# Patient Record
Sex: FEMALE | Race: WHITE | NOT HISPANIC OR LATINO | Employment: FULL TIME | URBAN - METROPOLITAN AREA
[De-identification: names, ages, dates, MRNs, and addresses within clinical notes are randomized per-mention and may not be internally consistent; named-entity substitution may affect disease eponyms.]

---

## 2023-02-15 ENCOUNTER — SOCIAL WORK (OUTPATIENT)
Dept: ADMINISTRATIVE | Facility: OTHER | Age: 58
End: 2023-02-15

## 2023-02-15 NOTE — PROGRESS NOTES
SW reviewed consult for pt in need of assistance for medication assistance (Humira). Pt is uninsured at this time. Pt presented to  clinic and met with  Nicol Owens for further assistance with getting established with PAP program. SW will continue to assist as needed.     ORESTES Gomez    [phone] (phone)  [phone] (fax)

## 2023-02-21 ENCOUNTER — SOCIAL WORK (OUTPATIENT)
Dept: ADMINISTRATIVE | Facility: OTHER | Age: 58
End: 2023-02-21

## 2023-02-21 NOTE — PROGRESS NOTES
Pt has establish a new enrollment for [facility] through PAP. A doctor signature is needed on the negin. Once received, pap negin will be submitted. will keep pt posted on enrollment status.    Nicol Owens    [phone]

## 2023-02-23 ENCOUNTER — SOCIAL WORK (OUTPATIENT)
Dept: ADMINISTRATIVE | Facility: OTHER | Age: 58
End: 2023-02-23

## 2023-02-23 NOTE — PROGRESS NOTES
A doctor signature has been received, pap negin has been submitted. A decision will be made in 7 to 10 days.    Nicol Owens    [phone]

## 2023-03-08 ENCOUNTER — SOCIAL WORK (OUTPATIENT)
Dept: ADMINISTRATIVE | Facility: OTHER | Age: 58
End: 2023-03-08

## 2023-06-22 ENCOUNTER — TELEPHONE (OUTPATIENT)
Dept: PHARMACY | Facility: CLINIC | Age: 58
End: 2023-06-22

## 2023-06-22 ENCOUNTER — SPECIALTY PHARMACY (OUTPATIENT)
Dept: PHARMACY | Facility: CLINIC | Age: 58
End: 2023-06-22

## 2023-06-22 DIAGNOSIS — M06.9 RHEUMATOID ARTHRITIS FLARE: Primary | ICD-10-CM

## 2023-06-22 NOTE — TELEPHONE ENCOUNTER
Hemalatha, this is Porsha Gray, clinical pharmacist with [facility] that is part of your care team.  We have begun working on your prescription that your doctor has sent us. Our next steps include:     Working with your insurance company to obtain approval for your medication  Working with you to ensure your medication is affordable     We will be calling you along the way with updates on your medication but if you have any concerns or receive information that you would like to discuss please reach us at [phone].    Welcome call outcome: Patient/caregiver reached.

## 2023-06-22 NOTE — TELEPHONE ENCOUNTER
Specialty Pharmacy - Initial Clinical Assessment    Specialty Medication Orders Linked to Encounter      Flowsheet Row Most Recent Value   Medication #1 methotrexate 2.5 MG Tab (Order#[id number], Rx#[id number])          Patient Diagnosis   Rheumatoid arthritis M60.9     Humera Briones is a 57 y.o. female, who is followed by the specialty pharmacy service for management and education.    Recent Encounters       Date Type Provider Description    06/22/2023 Specialty Pharmacy Porsha Gray PharmD Initial Clinical Assessment    06/22/2023 Specialty Pharmacy Porsha Gray PharmD             Current Outpatient Medications   Medication Sig    adalimumab 40 mg/0.4 mL PnKt Inject 40 mg into the skin every 14 (fourteen) days.    folic acid (FOLVITE) 1 MG tablet Take 2 tablets (2 mg total) by mouth once daily.    methotrexate 2.5 MG Tab Take 6 tablets (15 mg total) by mouth every 7 days.   Last reviewed on 6/22/2023  3:50 PM by Porsha Gray PharmD    Review of patient's allergies indicates:  No Known AllergiesLast reviewed on  6/22/2023 3:47 PM by Porsha Gray    Drug Interactions    Drug interactions evaluated: yes  Clinically relevant drug interactions identified: no  Provided the patient with educational material regarding drug interactions: not applicable         Adverse Effects    Arthralgias: Pos  Joint swelling: Pos       Assessment Questions - Documented Responses      Flowsheet Row Most Recent Value   Assessment    Medication Reconciliation completed for patient Yes   During the past 4 weeks, has patient missed any activities due to condition or medication? No   During the past 4 weeks, did patient have any of the following urgent care visits? None   Goals of Therapy Status Achieving   Status of the patients ability to self-administer: Is Able   All education points have been covered with patient? Yes, supplemental printed education provided   Welcome packet contents reviewed and  "discussed with patient? Yes   Assesment completed? Yes   Plan Therapy continued   Do you need to open a clinical intervention (i-vent)? No   Do you want to schedule first shipment? Yes          Refill Questions - Documented Responses      Flowsheet Row Most Recent Value   Patient Availability and HIPAA Verification    Does patient want to proceed with activity? Yes   HIPAA/medical authority confirmed? Yes   Relationship to patient of person spoken to? Self   Refill Screening Questions    When does the patient need to receive the medication? 06/27/23   Refill Delivery Questions    How will the patient receive the medication? Mail   When does the patient need to receive the medication? 06/27/23   Shipping Address Home   Address in [facility] confirmed and updated if neccessary? Yes   Expected Copay ($) 28.35   Is the patient able to afford the medication copay? Yes   Payment Method new CC added to file   Days supply of Refill 28   Supplies needed? No supplies needed   Refill activity completed? Yes   Refill activity plan Refill scheduled   Shipment/Pickup Date: 06/26/23            Objective    She has no past medical history on file.    Tried/failed medications: NSAIDS, steroids     BP Readings from Last 4 Encounters:   06/13/23 120/88   02/15/23 103/60   11/16/22 125/71   08/30/22 111/75     Ht Readings from Last 4 Encounters:   06/13/23 5' 2" (1.575 m)   02/15/23 5' 2" (1.575 m)   11/16/22 5' 2" (1.575 m)   08/30/22 5' 2" (1.575 m)     Wt Readings from Last 4 Encounters:   06/13/23 47.7 kg (105 lb 3.2 oz)   02/15/23 48.7 kg (107 lb 4.8 oz)   11/16/22 48.4 kg (106 lb 12.8 oz)   08/30/22 47.6 kg (105 lb)       The goals of rheumatoid arthritis treatment include:  Relieving pain and suppressing inflammation  Achieving remission and preventing joint and organ damage  Increasing joint mobility and strength  Preventing infection and other complications of treatment  Reducing long term complications of rheumatoid " arthritis  Improving or maintaining physical function and optimal well-being  Improving or maintaining quality of life  Maintaining optimal therapy adherence  Minimizing and managing side effects    Goals of Therapy Status: Achieving    Assessment/Plan  Patient plans to start therapy on 06/27/23      Indication, dosage, appropriateness, effectiveness, safety and convenience of her specialty medication(s) were reviewed today.     Patient Education   Patient received education on the following:   Expectations and possible outcomes of therapy  Proper use, timely administration, and missed dose management  Duration of therapy  Side effects, including prevention, minimization, and management  Contraindications and safety precautions  New or changed medications, including prescribe and over the counter medications and supplements  Reviews recommended vaccinations, as appropriate  Storage, safe handling, and disposal    Patient has taken Methotrexate for quite some time and declined education. Expressed understanding and no further questions.       Tasks added this encounter   No tasks added.   Tasks due within next 3 months   No tasks due.     Porsha Gray, PharmD  [facility]  [address] [facility] [address]  Phone: [phone]  Fax: [phone]

## 2023-07-17 ENCOUNTER — SPECIALTY PHARMACY (OUTPATIENT)
Dept: PHARMACY | Facility: CLINIC | Age: 58
End: 2023-07-17

## 2023-07-17 NOTE — TELEPHONE ENCOUNTER
Specialty Pharmacy - Refill Coordination    Specialty Medication Orders Linked to Encounter      Flowsheet Row Most Recent Value   Medication #1 methotrexate 2.5 MG Tab (Order#[id number], Rx#[id number])            Refill Questions - Documented Responses      Flowsheet Row Most Recent Value   Patient Availability and HIPAA Verification    Does patient want to proceed with activity? Yes   HIPAA/medical authority confirmed? Yes   Relationship to patient of person spoken to? Self   Refill Screening Questions    Changes to allergies? No   Changes to medications? No   New conditions since last clinic visit? No   Unplanned office visit, urgent care, ED, or hospital admission in the last 4 weeks? No   How does patient/caregiver feel medication is working? Good   Financial problems or insurance changes? No   How many doses of your specialty medications were missed in the last 4 weeks? 0   Would patient like to speak to a pharmacist? No   When does the patient need to receive the medication? 07/26/23   Refill Delivery Questions    How will the patient receive the medication? MEDRx   When does the patient need to receive the medication? 07/26/23   Shipping Address Home   Address in [facility] confirmed and updated if neccessary? Yes   Expected Copay ($) 28.45   Is the patient able to afford the medication copay? Yes   Payment Method CC on file   Days supply of Refill 28   Supplies needed? No supplies needed   Refill activity completed? Yes   Refill activity plan Refill scheduled   Shipment/Pickup Date: 07/24/23            Current Outpatient Medications   Medication Sig    adalimumab 40 mg/0.4 mL PnKt Inject 40 mg into the skin every 14 (fourteen) days.    folic acid (FOLVITE) 1 MG tablet Take 2 tablets (2 mg total) by mouth once daily.    methotrexate 2.5 MG Tab Take 6 tablets (15 mg total) by mouth every 7 days.   Last reviewed on 6/22/2023  3:50 PM by Porsha Gray, PharmD    Review of patient's allergies  indicates:  No Known Allergies Last reviewed on  6/22/2023 3:47 PM by Porsha Gray      Tasks added this encounter   No tasks added.   Tasks due within next 3 months   No tasks due.     [facility]  [address] [facility] [address]  Phone: [phone]  Fax: [phone]

## 2023-08-15 ENCOUNTER — SPECIALTY PHARMACY (OUTPATIENT)
Dept: PHARMACY | Facility: CLINIC | Age: 58
End: 2023-08-15

## 2023-08-16 NOTE — TELEPHONE ENCOUNTER
Specialty Pharmacy - Refill Coordination    Specialty Medication Orders Linked to Encounter      Flowsheet Row Most Recent Value   Medication #1 methotrexate 2.5 MG Tab (Order#[id number], Rx#[id number])            Refill Questions - Documented Responses      Flowsheet Row Most Recent Value   Patient Availability and HIPAA Verification    Does patient want to proceed with activity? Yes   HIPAA/medical authority confirmed? Yes   Relationship to patient of person spoken to? Self   Refill Screening Questions    Changes to allergies? No   Changes to medications? No   New conditions since last clinic visit? No   Unplanned office visit, urgent care, ED, or hospital admission in the last 4 weeks? No   How does patient/caregiver feel medication is working? Good   Financial problems or insurance changes? No   How many doses of your specialty medications were missed in the last 4 weeks? 0   Would patient like to speak to a pharmacist? No   When does the patient need to receive the medication? 08/23/23   Refill Delivery Questions    How will the patient receive the medication? Mail   When does the patient need to receive the medication? 08/23/23   Shipping Address Home   Address in [facility] confirmed and updated if neccessary? Yes   Expected Copay ($) 35.18   Is the patient able to afford the medication copay? Yes   Payment Method CC on file   Days supply of Refill 28   Supplies needed? No supplies needed   Refill activity completed? Yes   Refill activity plan Refill scheduled   Shipment/Pickup Date: 08/17/23            Current Outpatient Medications   Medication Sig    adalimumab 40 mg/0.4 mL PnKt Inject 40 mg into the skin every 14 (fourteen) days.    folic acid (FOLVITE) 1 MG tablet Take 2 tablets (2 mg total) by mouth once daily.    methotrexate 2.5 MG Tab Take 6 tablets (15 mg total) by mouth every 7 days.   Last reviewed on 6/22/2023  3:50 PM by Porsha Gray, PharmD    Review of patient's allergies  indicates:  No Known Allergies Last reviewed on  6/22/2023 3:47 PM by Porsha Gray      Tasks added this encounter   No tasks added.   Tasks due within next 3 months   8/14/2023 - Refill Coordination Outreach (1 time occurrence)     [facility]  [address]  [facility] [address]  Phone: [phone]  Fax: [phone]

## 2024-01-02 PROBLEM — M06.9 ARTHRITIS, RHEUMATOID: Status: ACTIVE | Noted: 2023-02-15

## 2024-04-17 ENCOUNTER — SOCIAL WORK (OUTPATIENT)
Dept: ADMINISTRATIVE | Facility: OTHER | Age: 59
End: 2024-04-17

## 2024-04-17 NOTE — PROGRESS NOTES
Pt has establish a new enrollment for Humira through PAP. Pt must provide financial documentation. Pt has agreed to do so. Once received, pap negin will be submitted.       Nicol Owens    [phone]   [phone] Fax

## 2024-04-23 ENCOUNTER — SOCIAL WORK (OUTPATIENT)
Dept: ADMINISTRATIVE | Facility: OTHER | Age: 59
End: 2024-04-23

## 2024-04-23 NOTE — PROGRESS NOTES
Patient has started a new enrollment for [facility] through PAP. The requested documents has been received, the PAP application has been submitted. A decision will made in 5 to 7 days. Notified pt of enrollment status.     [facility]  [phone]   [phone] Fax

## 2024-07-31 ENCOUNTER — SOCIAL WORK (OUTPATIENT)
Dept: ADMINISTRATIVE | Facility: OTHER | Age: 59
End: 2024-07-31

## 2024-07-31 NOTE — PROGRESS NOTES
A fax has been received regarding some missing information from the pt,on Humira, for PAP enrollment.  The PAP company has directly contacted this pt for the requested information. Once the documents are received, they will continue the evaluation. If the missing information is not received within 2 weeks, the case will closed. A call has been made to the pt, no answer and vm full [phone].      [facility]  [phone]   [phone] Fax